# Patient Record
Sex: FEMALE
[De-identification: names, ages, dates, MRNs, and addresses within clinical notes are randomized per-mention and may not be internally consistent; named-entity substitution may affect disease eponyms.]

---

## 2020-09-22 DIAGNOSIS — E03.9 HYPOTHYROIDISM, UNSPECIFIED: ICD-10-CM

## 2020-09-22 DIAGNOSIS — Z72.3 LACK OF PHYSICAL EXERCISE: ICD-10-CM

## 2020-09-22 DIAGNOSIS — R43.2 PARAGEUSIA: ICD-10-CM

## 2020-09-22 DIAGNOSIS — C44.90 UNSPECIFIED MALIGNANT NEOPLASM OF SKIN, UNSPECIFIED: ICD-10-CM

## 2020-09-22 DIAGNOSIS — Z82.49 FAMILY HISTORY OF ISCHEMIC HEART DISEASE AND OTHER DISEASES OF THE CIRCULATORY SYSTEM: ICD-10-CM

## 2020-09-22 RX ORDER — MULTIVIT-MIN/IRON/FOLIC ACID/K 18-600-40
CAPSULE ORAL
Refills: 0 | Status: ACTIVE | COMMUNITY

## 2020-09-22 RX ORDER — COLD-HOT PACK
EACH MISCELLANEOUS
Refills: 0 | Status: ACTIVE | COMMUNITY

## 2020-09-22 RX ORDER — CHROMIUM 200 MCG
TABLET ORAL
Refills: 0 | Status: ACTIVE | COMMUNITY

## 2020-09-22 RX ORDER — VITAMIN B COMPLEX
CAPSULE ORAL
Refills: 0 | Status: ACTIVE | COMMUNITY

## 2020-09-23 ENCOUNTER — NON-APPOINTMENT (OUTPATIENT)
Age: 60
End: 2020-09-23

## 2020-09-23 ENCOUNTER — APPOINTMENT (OUTPATIENT)
Dept: CARDIOLOGY | Facility: CLINIC | Age: 60
End: 2020-09-23
Payer: COMMERCIAL

## 2020-09-23 VITALS
HEART RATE: 70 BPM | TEMPERATURE: 97.9 F | DIASTOLIC BLOOD PRESSURE: 82 MMHG | WEIGHT: 179 LBS | BODY MASS INDEX: 30.56 KG/M2 | HEIGHT: 64 IN | SYSTOLIC BLOOD PRESSURE: 138 MMHG | RESPIRATION RATE: 16 BRPM

## 2020-09-23 DIAGNOSIS — R01.1 CARDIAC MURMUR, UNSPECIFIED: ICD-10-CM

## 2020-09-23 DIAGNOSIS — R09.82 POSTNASAL DRIP: ICD-10-CM

## 2020-09-23 DIAGNOSIS — U07.1 COVID-19: ICD-10-CM

## 2020-09-23 DIAGNOSIS — Z87.891 PERSONAL HISTORY OF NICOTINE DEPENDENCE: ICD-10-CM

## 2020-09-23 DIAGNOSIS — Z82.49 FAMILY HISTORY OF ISCHEMIC HEART DISEASE AND OTHER DISEASES OF THE CIRCULATORY SYSTEM: ICD-10-CM

## 2020-09-23 DIAGNOSIS — Z78.9 OTHER SPECIFIED HEALTH STATUS: ICD-10-CM

## 2020-09-23 PROCEDURE — 99204 OFFICE O/P NEW MOD 45 MIN: CPT

## 2020-09-23 PROCEDURE — 93000 ELECTROCARDIOGRAM COMPLETE: CPT

## 2020-09-23 RX ORDER — FLUTICASONE FUROATE 27.5 UG/1
27.5 SPRAY, METERED NASAL DAILY
Refills: 0 | Status: ACTIVE | COMMUNITY
Start: 2020-09-23

## 2020-09-23 NOTE — DISCUSSION/SUMMARY
[FreeTextEntry1] : 1. Check event monitor to further evaluate palpitations.\par 2. Check echocardiogram given palpitations and her history of COVID.\par 3. Check exercise stress test given her palpitations and family history.\par 4. Check carotid Doppler to further risk stratify and determine the importance of intensification of statin therapy.\par 5. Continue atorvastatin for dyslipidemia.\par 6. Patient encouraged to work on diet to lose weight.\par 7. Follow up here after testing, and will make further recommendations at that time.

## 2020-09-23 NOTE — PHYSICAL EXAM
[General Appearance - In No Acute Distress] : no acute distress [Normal Conjunctiva] : the conjunctiva exhibited no abnormalities [Normal Oral Mucosa] : normal oral mucosa [Auscultation Breath Sounds / Voice Sounds] : lungs were clear to auscultation bilaterally [Abdomen Soft] : soft [Abdomen Tenderness] : non-tender [Abnormal Walk] : normal gait [Nail Clubbing] : no clubbing of the fingernails [Cyanosis, Localized] : no localized cyanosis [Skin Color & Pigmentation] : normal skin color and pigmentation [Oriented To Time, Place, And Person] : oriented to person, place, and time [Affect] : the affect was normal [FreeTextEntry1] : No JVD, no carotid bruits. [Normal Rate] : normal [Rhythm Regular] : regular [Normal S1] : normal S1 [Normal S2] : normal S2 [S3] : no S3 [S4] : no S4 [II] : a grade 2

## 2020-09-23 NOTE — HISTORY OF PRESENT ILLNESS
[FreeTextEntry1] : Patient presents today for evaluation given recent palpitations. She had COVID back at the end of March and beginning of April. She recovered well from that but still has some issues with fatigue. She also still has some issues with loss of smell. More recently she has noticed palpitations. They had been off and on for the past couple of months, but have been less severe over the last few weeks. She had 2 particularly severe episodes of couple of weeks ago which were associated with lightheadedness but she did not syncopize.  She saw her PCP who did an EKG and advised to follow up with me. She does note some association with caffeine and also when she was taking Allegra. They seem to occur primarily at rest and at random. She does not have any real exertional symptoms at all. Patient denies chest pain, shortness of breath, orthopnea.

## 2020-09-23 NOTE — ASSESSMENT
[FreeTextEntry1] : EKG: Sinus rhythm with no significant ST or T wave changes.\par \par 60-year-old female with a past medical history of dyslipidemia and family history of CAD who presents to me for evaluation of palpitations. Patient's palpitations may in fact represent an arrhythmia but they do seem to also be improving. I recommend ambulatory monitoring to further evaluate for arrhythmia. Additionally given her history of COVID she should have an echocardiogram to look for any long-term effects and given her palpitations. Her lipids were apparently very elevated despite taking atorvastatin back in May but have improved now again although still mildly elevated.

## 2020-10-15 ENCOUNTER — APPOINTMENT (OUTPATIENT)
Dept: CARDIOLOGY | Facility: CLINIC | Age: 60
End: 2020-10-15
Payer: COMMERCIAL

## 2020-10-15 PROCEDURE — 93306 TTE W/DOPPLER COMPLETE: CPT

## 2020-10-15 PROCEDURE — 93015 CV STRESS TEST SUPVJ I&R: CPT

## 2020-10-20 ENCOUNTER — APPOINTMENT (OUTPATIENT)
Dept: CARDIOLOGY | Facility: CLINIC | Age: 60
End: 2020-10-20
Payer: COMMERCIAL

## 2020-10-20 VITALS
TEMPERATURE: 98 F | DIASTOLIC BLOOD PRESSURE: 82 MMHG | HEIGHT: 64 IN | RESPIRATION RATE: 16 BRPM | WEIGHT: 183 LBS | BODY MASS INDEX: 31.24 KG/M2 | SYSTOLIC BLOOD PRESSURE: 137 MMHG

## 2020-10-20 DIAGNOSIS — R42 DIZZINESS AND GIDDINESS: ICD-10-CM

## 2020-10-20 PROCEDURE — 99214 OFFICE O/P EST MOD 30 MIN: CPT

## 2020-10-20 NOTE — PHYSICAL EXAM
[General Appearance - In No Acute Distress] : no acute distress [Normal Conjunctiva] : the conjunctiva exhibited no abnormalities [Normal Oral Mucosa] : normal oral mucosa [Auscultation Breath Sounds / Voice Sounds] : lungs were clear to auscultation bilaterally [Normal Rate] : normal [Normal S2] : normal S2 [Normal S1] : normal S1 [Rhythm Regular] : regular [II] : a grade 2 [Abdomen Tenderness] : non-tender [Abnormal Walk] : normal gait [Abdomen Soft] : soft [Skin Color & Pigmentation] : normal skin color and pigmentation [Cyanosis, Localized] : no localized cyanosis [Nail Clubbing] : no clubbing of the fingernails [Affect] : the affect was normal [Oriented To Time, Place, And Person] : oriented to person, place, and time [FreeTextEntry1] : No JVD, no carotid bruits. [S3] : no S3 [S4] : no S4

## 2020-10-20 NOTE — HISTORY OF PRESENT ILLNESS
[FreeTextEntry1] : Patient has been feeling generally well. She is still having some very minor palpitations but no major episodes. No dizziness or lightheadedness. She is having some problems with her feet which have continued to limit her ability to exercise. She reports no other symptoms with exertion. She started wearing her monitor and had one slight episode of palpitations while wearing it so far. Patient denies chest pain, shortness of breath, palpitations, orthopnea, presyncope, syncope.

## 2020-10-20 NOTE — DISCUSSION/SUMMARY
[FreeTextEntry1] : 1. F/u event monitor to further evaluate palpitations.\par 2. Check carotid Doppler to further risk stratify and determine the importance of intensification of statin therapy.\par 3. Continue atorvastatin for dyslipidemia.\par 4. Patient encouraged to work on diet to lose weight.\par 5. Patient is encouraged to exercise at least 30 minutes a day everyday of the week.\par 6. Follow up here in one month.

## 2020-10-20 NOTE — ASSESSMENT
[FreeTextEntry1] : Echocardiogram October 15, 2020 demonstrated left ventricle normal size and function with ejection fraction of 60-65%. No significant valvular or structural abnormality noted.\par \par Exercise stress test October 16, 2020 during which the patient exercised via Rick protocol for 5 minutes and 10 seconds, totaling 6 METs. Exercise was limited by foot pain. Peak heart rate achieved was 146 beats per minute, representing 91% of age-predicted maximum. Blood pressure response was normal. EKG showed no evidence of ischemia.\par \par 60-year-old female with a past medical history of dyslipidemia and family history of CAD who presents to me for f/u of palpitations. Palpitations have improved and are minimal at this time. An echocardiogram and stress testing unremarkable. Her workload was low on the stress test but this was more limited by foot pain. EKG was unremarkable. Review of her monitor for the last few days to show one episode of  beats but this may have been asymptomatic. She is not entirely sure. For now we will continue on the monitor and I will followup when it is completed.

## 2020-11-24 ENCOUNTER — APPOINTMENT (OUTPATIENT)
Dept: CARDIOLOGY | Facility: CLINIC | Age: 60
End: 2020-11-24
Payer: COMMERCIAL

## 2020-11-24 VITALS
WEIGHT: 179 LBS | RESPIRATION RATE: 16 BRPM | DIASTOLIC BLOOD PRESSURE: 80 MMHG | OXYGEN SATURATION: 99 % | BODY MASS INDEX: 30.56 KG/M2 | TEMPERATURE: 97.2 F | HEART RATE: 71 BPM | SYSTOLIC BLOOD PRESSURE: 131 MMHG | HEIGHT: 64 IN

## 2020-11-24 PROCEDURE — 99214 OFFICE O/P EST MOD 30 MIN: CPT

## 2020-11-24 PROCEDURE — 93000 ELECTROCARDIOGRAM COMPLETE: CPT

## 2020-11-24 NOTE — DISCUSSION/SUMMARY
[FreeTextEntry1] : 1. No additional cardiac testing at this time.\par 2. Continue atorvastatin for dyslipidemia.\par 3. Monitor BP at home, keep a log and bring to f/u.\par 4. Patient encouraged to work on diet to lose weight.\par 5. Patient is encouraged to exercise at least 30 minutes a day everyday of the week.\par 6. Patient encouraged  to find ways to reduce stress, such as meditation and/or yoga.\par 7. Follow up here in 4 months.

## 2020-11-24 NOTE — REASON FOR VISIT
[FreeTextEntry1] : Patient comes for f/u of palpitations and SVT
Breath sounds clear and equal bilaterally.

## 2020-11-24 NOTE — ASSESSMENT
[FreeTextEntry1] : Echocardiogram October 15, 2020 demonstrated left ventricle normal size and function with ejection fraction of 60-65%. No significant valvular or structural abnormality noted.\par \par Exercise stress test October 16, 2020 during which the patient exercised via Rick protocol for 5 minutes and 10 seconds, totaling 6 METs. Exercise was limited by foot pain. Peak heart rate achieved was 146 beats per minute, representing 91% of age-predicted maximum. Blood pressure response was normal. EKG showed no evidence of ischemia.\par \par Carotid Doppler October 21, 2020 demonstrated no plaque and was normal bilaterally.\par \par 60-year-old female with a past medical history of dyslipidemia, SVT and family history of CAD who presents to me for f/u of palpitations. Palpitations have improved.  Review of the rest of her event monitor shows no further SVT or any other events.  Her palpitations have improved, even more so since she discontinued the use of caffeine.  Certainly continued efforts at stress relief and trying to start exercising more as her heel spur may be improving will also help.  Blood pressure appears to be reasonably controlled for now and I do not see need for treatment.

## 2020-11-24 NOTE — HISTORY OF PRESENT ILLNESS
[FreeTextEntry1] : Patient presents back today having really no further issues with palpitations.  She has cut out caffeine entirely because she thought it may be responsible for her palpitations and also some occasional elevated blood pressures.  Blood pressures have mostly been in the 120s over 70s to 80s, a bit higher on her left arm than her right.  She notes that when she drinks caffeine it is higher into the 130s or even 140s.  She reports no other new symptoms at this time.  Patient denies chest pain, shortness of breath, orthopnea, presyncope, syncope.

## 2021-03-16 ENCOUNTER — APPOINTMENT (OUTPATIENT)
Dept: CARDIOLOGY | Facility: CLINIC | Age: 61
End: 2021-03-16

## 2021-05-11 ENCOUNTER — APPOINTMENT (OUTPATIENT)
Dept: PHYSICAL MEDICINE AND REHAB | Facility: CLINIC | Age: 61
End: 2021-05-11
Payer: COMMERCIAL

## 2021-05-11 VITALS
OXYGEN SATURATION: 99 % | TEMPERATURE: 97 F | HEIGHT: 64 IN | BODY MASS INDEX: 30.73 KG/M2 | DIASTOLIC BLOOD PRESSURE: 89 MMHG | WEIGHT: 180 LBS | HEART RATE: 62 BPM | RESPIRATION RATE: 14 BRPM | SYSTOLIC BLOOD PRESSURE: 143 MMHG

## 2021-05-11 DIAGNOSIS — M17.0 BILATERAL PRIMARY OSTEOARTHRITIS OF KNEE: ICD-10-CM

## 2021-05-11 DIAGNOSIS — Z86.39 PERSONAL HISTORY OF OTHER ENDOCRINE, NUTRITIONAL AND METABOLIC DISEASE: ICD-10-CM

## 2021-05-11 DIAGNOSIS — Z78.9 OTHER SPECIFIED HEALTH STATUS: ICD-10-CM

## 2021-05-11 PROCEDURE — 99204 OFFICE O/P NEW MOD 45 MIN: CPT

## 2021-05-11 PROCEDURE — 99072 ADDL SUPL MATRL&STAF TM PHE: CPT

## 2021-05-11 RX ORDER — LEVOTHYROXINE SODIUM 88 UG/1
88 TABLET ORAL DAILY
Refills: 0 | Status: DISCONTINUED | COMMUNITY
End: 2021-05-11

## 2021-05-11 NOTE — PHYSICAL EXAM
[FreeTextEntry1] : NAD\par A&Ox3\par Non-obese\par Inspection knees:\par Effusion: none\par ROM: full\par JLT: LEFT > RIGHT MJLT\par Patella: LOCATED\par Stability: STABLE V/V stresses\par Armando's: deferred\par MMT: 5/5 KE\par Sensation: SILT\par LEFT ANKLE/HEEL\par ROM 10-15' DF\par DISTAL 1/3 ACHILLES TTP W/ PALPABLE ENTHESOPHYTES\par MEDIAL CALCANEAL TUBERCLE TTP\par MEDIAL CORD PLANTAR FASCIA NTTP\par NEGATIVE WINDLASS TEST\par RIGHT ANKLE/HEEL\par DISTAL 1/3 ACHILLES MILDLY TTP\par

## 2021-05-11 NOTE — ASSESSMENT
[FreeTextEntry1] : 61 y.o. F w/ h/o b/l knee OA, heel spurs and partial left AT tear/tendinopathy.  I spent most of today's visit (30 min) discussing the pathogenesis of the patient's painful MSK conditions.  I have asked her to obtain her most recent xrays for my review.  We will order new xrays b/l knees.  Will perform diagnostic MSK US examinations b/l Achilles and posterior ankle to evaluate extent of tear and tendinopathy.   Advised against repeat CSI to heel to avoid propagation of tendon/fascia tears.  May consider regen medicine procedures (ie, PRP) after US examination.  Upon review of imaging, will start P.T. for modalities, gentle ROM and strengthening exercises.

## 2021-05-11 NOTE — HISTORY OF PRESENT ILLNESS
[FreeTextEntry1] : 61 y.o. F w/ h/o PSVT and COVID infection (April 2020) presents to office w/ c/o b/l knee and heel pain.  Pt. states that she suffered partially torn Achilles tendon left ankle (Jan 2020) and wore walking boot (June 2020).  Pt. has been under care of podiatrist and outside ortho.  Pt. has had P.T. for her ankle and heel pain.  No recent knee x-rays but had xrays of heels.  Pt. states that she had CSI x 2 left heel which may have causes Achilles tendon tear.

## 2021-06-07 ENCOUNTER — APPOINTMENT (OUTPATIENT)
Dept: PHYSICAL MEDICINE AND REHAB | Facility: CLINIC | Age: 61
End: 2021-06-07

## 2021-06-14 ENCOUNTER — APPOINTMENT (OUTPATIENT)
Dept: OTOLARYNGOLOGY | Facility: CLINIC | Age: 61
End: 2021-06-14
Payer: COMMERCIAL

## 2021-06-14 VITALS
BODY MASS INDEX: 31.24 KG/M2 | TEMPERATURE: 97.3 F | HEIGHT: 64 IN | HEART RATE: 66 BPM | SYSTOLIC BLOOD PRESSURE: 134 MMHG | DIASTOLIC BLOOD PRESSURE: 78 MMHG | WEIGHT: 183 LBS

## 2021-06-14 DIAGNOSIS — H61.23 IMPACTED CERUMEN, BILATERAL: ICD-10-CM

## 2021-06-14 DIAGNOSIS — J30.9 ALLERGIC RHINITIS, UNSPECIFIED: ICD-10-CM

## 2021-06-14 DIAGNOSIS — H90.3 SENSORINEURAL HEARING LOSS, BILATERAL: ICD-10-CM

## 2021-06-14 PROCEDURE — G0268 REMOVAL OF IMPACTED WAX MD: CPT

## 2021-06-14 PROCEDURE — 99213 OFFICE O/P EST LOW 20 MIN: CPT | Mod: 25

## 2021-06-14 PROCEDURE — 99072 ADDL SUPL MATRL&STAF TM PHE: CPT

## 2021-06-14 PROCEDURE — 92567 TYMPANOMETRY: CPT

## 2021-06-14 PROCEDURE — 92557 COMPREHENSIVE HEARING TEST: CPT

## 2021-06-14 NOTE — REVIEW OF SYSTEMS
[Sneezing] : sneezing [Seasonal Allergies] : seasonal allergies [Post Nasal Drip] : post nasal drip [Ear Pain] : ear pain [Ear Itch] : ear itch [Hearing Loss] : hearing loss [Ear Noises] : ear noises [Sinus Pressure] : sinus pressure [Hoarseness] : hoarseness [Heartburn] : heartburn [As Noted in HPI] : as noted in HPI [FreeTextEntry1] : headache,watery itchy eyes

## 2021-06-14 NOTE — HISTORY OF PRESENT ILLNESS
[de-identified] : 61 yr old female c/o itch AU and otalgia AS\par +allergy acting up, doing well on Flonase Sensimyst\par +known SNHL\par +tinnitus\par +vertigo 10 yrs ago, not since then, tx w Epley\par +hx CI\par +hx otitis\par -noise exp, head trauma\par +FH father, pat aunt cochlear implant

## 2021-06-14 NOTE — PHYSICAL EXAM
[Normal] : mucosa is normal [Midline] : trachea located in midline position [de-identified] : CI AU

## 2021-06-14 NOTE — DATA REVIEWED
[de-identified] : Hx of Bilateral SNHL, c/o left otalgia\par - Type A tymps AU\par - Results obtained via insert earphones revealed Hearing WNL sloping to a moderate to moderately-severe SNHL AU (Slight Right Asymm noted at 3000hz)\par Rec: 1) ENT F/u 2) Re-eval as per md 3) Further testing as per MD 4) Binaural amplification pending MD clearance

## 2021-07-12 ENCOUNTER — APPOINTMENT (OUTPATIENT)
Dept: PHARMACY | Facility: CLINIC | Age: 61
End: 2021-07-12

## 2022-01-05 ENCOUNTER — NON-APPOINTMENT (OUTPATIENT)
Age: 62
End: 2022-01-05

## 2022-01-05 ENCOUNTER — APPOINTMENT (OUTPATIENT)
Dept: CARDIOLOGY | Facility: CLINIC | Age: 62
End: 2022-01-05
Payer: COMMERCIAL

## 2022-01-05 VITALS
HEIGHT: 64 IN | WEIGHT: 185 LBS | SYSTOLIC BLOOD PRESSURE: 147 MMHG | HEART RATE: 66 BPM | OXYGEN SATURATION: 99 % | BODY MASS INDEX: 31.58 KG/M2 | DIASTOLIC BLOOD PRESSURE: 87 MMHG | RESPIRATION RATE: 16 BRPM

## 2022-01-05 PROCEDURE — 99214 OFFICE O/P EST MOD 30 MIN: CPT

## 2022-01-05 PROCEDURE — 93000 ELECTROCARDIOGRAM COMPLETE: CPT

## 2022-01-05 RX ORDER — LEVOTHYROXINE SODIUM 0.09 MG/1
88 TABLET ORAL
Refills: 0 | Status: DISCONTINUED | COMMUNITY
End: 2022-01-05

## 2022-01-05 NOTE — ASSESSMENT
[FreeTextEntry1] : Echocardiogram October 15, 2020 demonstrated left ventricle normal size and function with ejection fraction of 60-65%. No significant valvular or structural abnormality noted.\par \par Exercise stress test October 16, 2020 during which the patient exercised via Rick protocol for 5 minutes and 10 seconds, totaling 6 METs. Exercise was limited by foot pain. Peak heart rate achieved was 146 beats per minute, representing 91% of age-predicted maximum. Blood pressure response was normal. EKG showed no evidence of ischemia.\par \par Carotid Doppler October 21, 2020 demonstrated no plaque and was normal bilaterally.\par \par EKG: Sinus rhythm with nonspecific T wave changes, not significantly changed from prior.\par \par 60-year-old female with a past medical history of dyslipidemia, SVT and family history of CAD who presents to me for f/u.  Patient appears reasonably stable from a cardiac standpoint.  Her palpitations have remained controlled since she has been off the Synthroid.  Blood pressure appears to be reasonably controlled as well and there is no evidence of significant hypertension.  Her lipids are well controlled with atorvastatin.  Certainly she needs to work more on diet, exercise and weight loss.  She will follow-up as to whether or not she needs to be back on Synthroid as well which may also help with her weight loss.  There is no evidence of recurrent arrhythmia at this time.

## 2022-01-05 NOTE — DISCUSSION/SUMMARY
[FreeTextEntry1] : 1. No additional cardiac testing at this time.\par 2. Continue atorvastatin for dyslipidemia.\par 3. Monitor BP at home, keep a log and bring to f/u.\par 4. Patient encouraged to work on diet to lose weight.\par 5. Patient is encouraged to exercise at least 30 minutes a day everyday of the week.\par 6. Patient encouraged  to find ways to reduce stress, such as meditation and/or yoga.\par 7. Follow up here in 6 months.

## 2022-01-05 NOTE — HISTORY OF PRESENT ILLNESS
[FreeTextEntry1] : Patient presents back to the office today having not been seen in over a year.  Her initial appointment had been canceled and she says "life got in the way".  She had been having some issues with palpitations again and she thought it was related to her Synthroid so she stopped it.  After speaking to her doctor she went back on an every other day but ultimately stopped it completely again.  In October her thyroid functions off the Synthroid were actually normal.  More recently she has been noting some hair loss and issues with gaining weight and is wondering if she needs to be on the Synthroid again.  She believes that she has side effects from the generic and needs to be on the trade Synthroid.  Her palpitations have not really returned at all and she is otherwise feeling physically well.  She is taking atorvastatin faithfully.  She reports blood pressures at home in the 120s over 70s.  Patient denies chest pain, shortness of breath, orthopnea, presyncope, syncope.

## 2022-09-07 ENCOUNTER — NON-APPOINTMENT (OUTPATIENT)
Age: 62
End: 2022-09-07

## 2022-09-07 ENCOUNTER — APPOINTMENT (OUTPATIENT)
Dept: CARDIOLOGY | Facility: CLINIC | Age: 62
End: 2022-09-07

## 2022-09-07 VITALS
OXYGEN SATURATION: 97 % | HEART RATE: 99 BPM | WEIGHT: 186 LBS | DIASTOLIC BLOOD PRESSURE: 83 MMHG | RESPIRATION RATE: 16 BRPM | HEIGHT: 64 IN | BODY MASS INDEX: 31.76 KG/M2 | SYSTOLIC BLOOD PRESSURE: 121 MMHG

## 2022-09-07 DIAGNOSIS — R03.0 ELEVATED BLOOD-PRESSURE READING, W/OUT DIAGNOSIS OF HYPERTENSION: ICD-10-CM

## 2022-09-07 PROCEDURE — 93000 ELECTROCARDIOGRAM COMPLETE: CPT

## 2022-09-07 PROCEDURE — 99214 OFFICE O/P EST MOD 30 MIN: CPT | Mod: 25

## 2022-09-07 RX ORDER — ATORVASTATIN CALCIUM 20 MG/1
20 TABLET, FILM COATED ORAL
Qty: 30 | Refills: 3 | Status: DISCONTINUED | COMMUNITY
End: 2022-09-07

## 2022-09-07 NOTE — HISTORY OF PRESENT ILLNESS
[FreeTextEntry1] : Patient Akitz back today noting that she has almost no palpitations at this point.  She has been very cautiously avoiding caffeine and using only decaf and trying to avoid other sources of caffeine.  With doing that her palpitations have almost completely resolved.  She has not been very consistent with taking her atorvastatin.  She is concerned that it is causing some pains that she has been having in her legs.  She has a lot of pain in her legs especially when she lays down and gets into certain positions.  She tries using heat and cold and this seems to help to some degree.  She has ultimately been taking the atorvastatin 3 to 4 days a week because of this.  The last week has been particularly more severe for her legs and so she has not been taking it at all.  Besides her legs she reports no other physical symptoms.  Patient denies chest pain, shortness of breath, palpitations, orthopnea, presyncope, syncope.

## 2022-09-07 NOTE — ASSESSMENT
[FreeTextEntry1] : Echocardiogram October 15, 2020 demonstrated left ventricle normal size and function with ejection fraction of 60-65%. No significant valvular or structural abnormality noted.\par \par Exercise stress test October 16, 2020 during which the patient exercised via Rick protocol for 5 minutes and 10 seconds, totaling 6 METs. Exercise was limited by foot pain. Peak heart rate achieved was 146 beats per minute, representing 91% of age-predicted maximum. Blood pressure response was normal. EKG showed no evidence of ischemia.\par \par Carotid Doppler October 21, 2020 demonstrated no plaque and was normal bilaterally.\par \par EKG: Sinus rhythm with no significant ST or T wave changes.\par \par 62-year-old female with a past medical history of dyslipidemia, SVT and family history of CAD who presents to me for f/u.  Patient appears stable from a cardiac standpoint.  No evidence of significant recurrent SVT and her palpitations are well controlled.  EKG today is unremarkable.  She has been taking atorvastatin only intermittently because of issues with pains in her legs.  The description of the pains in her legs seem to be unlikely related to her statin.  I will change it to rosuvastatin given her concerns however and also encouraged her to take coenzyme every 10.  She should follow-up with her primary for further evaluation of the issues with the legs which could be related to her back or possibly her hips.  Blood pressure is adequately controlled at this time.

## 2022-09-07 NOTE — DISCUSSION/SUMMARY
[FreeTextEntry1] : 1. No additional cardiac testing at this time.\par 2. Change atorvastatin to rosuvastatin 10 mg daily for her dyslipidemia.  She will take coenzyme every 10 200 mg daily as well to try and help with the leg pains.  Blood work prior to follow-up.\par 3. Monitor BP at home, keep a log and bring to f/u.\par 4. Patient encouraged to work on diet to lose weight.\par 5. Patient is encouraged to exercise at least 30 minutes a day everyday of the week.\par 6. Patient encouraged  to find ways to reduce stress, such as meditation and/or yoga.\par 7. Follow up here in 6 months. [EKG obtained to assist in diagnosis and management of assessed problem(s)] : EKG obtained to assist in diagnosis and management of assessed problem(s)

## 2022-12-06 ENCOUNTER — APPOINTMENT (OUTPATIENT)
Dept: ORTHOPEDIC SURGERY | Facility: CLINIC | Age: 62
End: 2022-12-06

## 2022-12-06 VITALS — WEIGHT: 161 LBS | BODY MASS INDEX: 27.49 KG/M2 | HEIGHT: 64 IN

## 2022-12-06 DIAGNOSIS — S61.431S: ICD-10-CM

## 2022-12-06 DIAGNOSIS — L03.011 CELLULITIS OF RIGHT FINGER: ICD-10-CM

## 2022-12-06 DIAGNOSIS — Z78.9 OTHER SPECIFIED HEALTH STATUS: ICD-10-CM

## 2022-12-06 PROCEDURE — 99203 OFFICE O/P NEW LOW 30 MIN: CPT

## 2022-12-06 NOTE — DISCUSSION/SUMMARY
[de-identified] : Although she describes a thorn puncture injury, there are only local sings of cellulitis, improving , no signs of progressive infection, ie lymphadenopathy or cutaneous signs up the forearm.  Will monitor.  Early return criteria discussed

## 2022-12-06 NOTE — PHYSICAL EXAM
[Right] : right hand [FreeTextEntry3] : Induration over radial volar middle phalanx \par Flexor sheath non tender \par Hyperemia over middle phalanx  [de-identified] : rt index finger swelling

## 2022-12-06 NOTE — HISTORY OF PRESENT ILLNESS
[Sudden] : sudden [4] : 4 [Dull/Aching] : dull/aching [Constant] : constant [Nothing helps with pain getting better] : Nothing helps with pain getting better [de-identified] : 62 year old female presenting with RT index finger puncture wound with a flower thorn. Swelling for about 3 weeks. She was given a 5 day course of abx (z-pack). She is still with swelling in the finger.  [] : no [FreeTextEntry1] : rt index finger [FreeTextEntry3] : 1 week  [FreeTextEntry5] : pt stated she was working when she poked herself with a thorn causing swelling and pain  [de-identified] : use

## 2022-12-23 ENCOUNTER — APPOINTMENT (OUTPATIENT)
Dept: ORTHOPEDIC SURGERY | Facility: CLINIC | Age: 62
End: 2022-12-23

## 2023-01-05 ENCOUNTER — APPOINTMENT (OUTPATIENT)
Dept: CARDIOLOGY | Facility: CLINIC | Age: 63
End: 2023-01-05
Payer: COMMERCIAL

## 2023-01-05 VITALS
DIASTOLIC BLOOD PRESSURE: 81 MMHG | BODY MASS INDEX: 27.14 KG/M2 | OXYGEN SATURATION: 100 % | RESPIRATION RATE: 16 BRPM | WEIGHT: 159 LBS | HEIGHT: 64 IN | HEART RATE: 75 BPM | SYSTOLIC BLOOD PRESSURE: 118 MMHG

## 2023-01-05 PROCEDURE — 99214 OFFICE O/P EST MOD 30 MIN: CPT | Mod: 25

## 2023-01-05 PROCEDURE — 93000 ELECTROCARDIOGRAM COMPLETE: CPT

## 2023-01-05 RX ORDER — ROSUVASTATIN CALCIUM 10 MG/1
10 TABLET, FILM COATED ORAL
Qty: 90 | Refills: 1 | Status: DISCONTINUED | COMMUNITY
Start: 2022-09-07 | End: 2023-01-05

## 2023-01-05 RX ORDER — ATORVASTATIN CALCIUM 20 MG/1
20 TABLET, FILM COATED ORAL DAILY
Refills: 0 | Status: ACTIVE | COMMUNITY

## 2023-01-05 NOTE — DISCUSSION/SUMMARY
[FreeTextEntry1] : 1. No additional cardiac testing at this time.\par 2.  Continue atorvastatin for her dyslipidemia.  She will also take coenzyme every 10 200 mg daily as well to try and help with the leg pains.  Blood work with her primary at the end of the month.\par 3. Monitor BP at home, keep a log and bring to f/u.\par 4. Patient encouraged to work on diet to lose weight.\par 5. Patient is encouraged to exercise at least 30 minutes a day everyday of the week.\par 6. Follow up here in 6 months. [EKG obtained to assist in diagnosis and management of assessed problem(s)] : EKG obtained to assist in diagnosis and management of assessed problem(s)

## 2023-01-05 NOTE — ASSESSMENT
[FreeTextEntry1] : Echocardiogram October 15, 2020 demonstrated left ventricle normal size and function with ejection fraction of 60-65%. No significant valvular or structural abnormality noted.\par \par Exercise stress test October 16, 2020 during which the patient exercised via Rick protocol for 5 minutes and 10 seconds, totaling 6 METs. Exercise was limited by foot pain. Peak heart rate achieved was 146 beats per minute, representing 91% of age-predicted maximum. Blood pressure response was normal. EKG showed no evidence of ischemia.\par \par Carotid Doppler October 21, 2020 demonstrated no plaque and was normal bilaterally.\par \par 1 month event monitor October 12 to November 10, 2020 demonstrated presenting rhythm was sinus with an average heart rate of 73 bpm, maximum 172, minimum 50 bpm.  A single episode of SVT was noted totaling 11 beats.  Rare PACs and PVCs.\par \par EKG: Sinus rhythm with borderline T wave changes.\par \par 62-year-old female with a past medical history of dyslipidemia, SVT and family history of CAD who presents to me for f/u.  Patient appears stable from a cardiac standpoint.  No evidence of significant recurrent SVT and her palpitations are well controlled.  EKG today is unremarkable.  She never got rosuvastatin but has been taking atorvastatin every day and seems to be generally tolerating it.  The pains in her legs and feet still appear to be unlikely secondary to her statin.  I have encouraged her to take coenzyme Q10 which may help with the leg pains but also to follow-up with her primary.  She is going to have blood work at the end of the month.  If her lipids are controlled she will just continue with atorvastatin.  Blood pressures appear to be reasonably controlled for now although she needs to continue to watch them closely.

## 2023-01-05 NOTE — HISTORY OF PRESENT ILLNESS
[FreeTextEntry1] : Patient presents back to the office today still taking atorvastatin but now taking it every day.  There was some confusion about the potential change to rosuvastatin and she never got the prescription.  She says she is still having pains in her legs but not every day and they are not very severe.  She is willing to continue with atorvastatin every day.  Blood pressure she reports in 110s to the 130s over 70s to 80s.  She reports no other physical symptoms at this time.  No dizziness or lightheadedness.  Patient denies chest pain, shortness of breath, palpitations, orthopnea, presyncope, syncope.

## 2023-10-06 ENCOUNTER — APPOINTMENT (OUTPATIENT)
Dept: CARDIOLOGY | Facility: CLINIC | Age: 63
End: 2023-10-06
Payer: COMMERCIAL

## 2023-10-06 ENCOUNTER — NON-APPOINTMENT (OUTPATIENT)
Age: 63
End: 2023-10-06

## 2023-10-06 VITALS
SYSTOLIC BLOOD PRESSURE: 122 MMHG | DIASTOLIC BLOOD PRESSURE: 85 MMHG | HEIGHT: 64 IN | RESPIRATION RATE: 16 BRPM | WEIGHT: 146 LBS | BODY MASS INDEX: 24.92 KG/M2 | HEART RATE: 67 BPM

## 2023-10-06 DIAGNOSIS — R07.89 OTHER CHEST PAIN: ICD-10-CM

## 2023-10-06 DIAGNOSIS — I47.10 SUPRAVENTRICULAR TACHYCARDIA, UNSPECIFIED: ICD-10-CM

## 2023-10-06 DIAGNOSIS — R00.2 PALPITATIONS: ICD-10-CM

## 2023-10-06 DIAGNOSIS — E78.5 HYPERLIPIDEMIA, UNSPECIFIED: ICD-10-CM

## 2023-10-06 PROCEDURE — 93000 ELECTROCARDIOGRAM COMPLETE: CPT

## 2023-10-06 PROCEDURE — 99214 OFFICE O/P EST MOD 30 MIN: CPT | Mod: 25

## 2023-10-06 RX ORDER — LEVOTHYROXINE SODIUM 137 UG/1
TABLET ORAL
Refills: 0 | Status: ACTIVE | COMMUNITY

## 2023-11-27 ENCOUNTER — APPOINTMENT (OUTPATIENT)
Dept: CARDIOLOGY | Facility: CLINIC | Age: 63
End: 2023-11-27
Payer: COMMERCIAL

## 2023-11-27 PROCEDURE — 93015 CV STRESS TEST SUPVJ I&R: CPT

## 2023-11-27 PROCEDURE — 93306 TTE W/DOPPLER COMPLETE: CPT

## 2024-08-14 ENCOUNTER — APPOINTMENT (OUTPATIENT)
Dept: VASCULAR SURGERY | Facility: CLINIC | Age: 64
End: 2024-08-14
Payer: COMMERCIAL

## 2024-08-14 VITALS
SYSTOLIC BLOOD PRESSURE: 142 MMHG | BODY MASS INDEX: 23.73 KG/M2 | HEIGHT: 64 IN | HEART RATE: 65 BPM | WEIGHT: 139 LBS | DIASTOLIC BLOOD PRESSURE: 84 MMHG | OXYGEN SATURATION: 98 %

## 2024-08-14 DIAGNOSIS — I83.93 ASYMPTOMATIC VARICOSE VEINS OF BILATERAL LOWER EXTREMITIES: ICD-10-CM

## 2024-08-14 PROCEDURE — 99204 OFFICE O/P NEW MOD 45 MIN: CPT

## 2024-08-14 PROCEDURE — 93970 EXTREMITY STUDY: CPT

## 2024-08-14 RX ORDER — LOVASTATIN 40 MG/1
TABLET ORAL
Refills: 0 | Status: ACTIVE | COMMUNITY

## 2024-08-14 NOTE — ASSESSMENT
[FreeTextEntry1] : 64 year old female with h/o longstanding superficial venous insufficiency, symptomatic varicosities, C2 CVI -encouraged use of compression hose -would benefit from  1. Right SAP UGS  2. Left SAP UGS Cosmetic injection sclerotherapy, 3-6 sessions to follow if desired

## 2024-08-14 NOTE — PHYSICAL EXAM
[2+] : left 2+ [Varicose Veins Of Lower Extremities] : bilaterally [Ankle Swelling On The Left] : moderate [] : present [Ankle Swelling On The Right] : mild [No Rash or Lesion] : No rash or lesion [Alert] : alert [Oriented to Person] : oriented to person [Oriented to Place] : oriented to place [Oriented to Time] : oriented to time [Calm] : calm [Ankle Swelling (On Exam)] : not present [de-identified] : alert/oriented NAD  [FreeTextEntry1] : LE warm, well perfused  brisk cap refill < 3 seconds  right medial calf dilated > 3mm varicosities with associated telangiectasias  left posterior thigh varicosities 2-3mm  no SVT  no edema  [de-identified] : no stu joint deformity

## 2024-08-14 NOTE — HISTORY OF PRESENT ILLNESS
[FreeTextEntry1] : 64 year old female with PMH of HLD, Hypothyroidism presents for vascular consultation for progressively worsening symptomatic varicosities of both lower extremities.  Complains of longstanding leg pain, aching and fatigue with dependency.  Has tried compression hose in the past with minimal relief.  Denies previous venous interventions, h/o DVT or SVT/thrombophilia.   Does report a family history of Factor V Leiden (sister). Denies prior personal thrombophilia workup.  Works as a  and stands on her feet for prolonged periods of time.  She denies associated edema or history of non healing ulcerations.

## 2024-12-02 ENCOUNTER — TRANSCRIPTION ENCOUNTER (OUTPATIENT)
Age: 64
End: 2024-12-02

## 2024-12-02 ENCOUNTER — APPOINTMENT (OUTPATIENT)
Dept: VASCULAR SURGERY | Facility: CLINIC | Age: 64
End: 2024-12-02
Payer: COMMERCIAL

## 2024-12-02 PROCEDURE — 37766 PHLEB VEINS - EXTREM 20+: CPT | Mod: RT

## 2024-12-02 PROCEDURE — 36471 NJX SCLRSNT MLT INCMPTNT VN: CPT | Mod: RT

## 2024-12-02 PROCEDURE — 76942 ECHO GUIDE FOR BIOPSY: CPT

## 2024-12-09 ENCOUNTER — APPOINTMENT (OUTPATIENT)
Dept: VASCULAR SURGERY | Facility: CLINIC | Age: 64
End: 2024-12-09

## 2024-12-09 DIAGNOSIS — I83.93 ASYMPTOMATIC VARICOSE VEINS OF BILATERAL LOWER EXTREMITIES: ICD-10-CM

## 2024-12-09 PROCEDURE — 93971 EXTREMITY STUDY: CPT | Mod: RT

## 2024-12-09 PROCEDURE — 99024 POSTOP FOLLOW-UP VISIT: CPT

## 2025-01-06 ENCOUNTER — APPOINTMENT (OUTPATIENT)
Dept: VASCULAR SURGERY | Facility: CLINIC | Age: 65
End: 2025-01-06
Payer: COMMERCIAL

## 2025-01-06 DIAGNOSIS — I83.93 ASYMPTOMATIC VARICOSE VEINS OF BILATERAL LOWER EXTREMITIES: ICD-10-CM

## 2025-01-06 PROCEDURE — 99212 OFFICE O/P EST SF 10 MIN: CPT

## 2025-02-24 ENCOUNTER — NON-APPOINTMENT (OUTPATIENT)
Age: 65
End: 2025-02-24

## 2025-02-26 ENCOUNTER — APPOINTMENT (OUTPATIENT)
Dept: CARDIOLOGY | Facility: CLINIC | Age: 65
End: 2025-02-26

## 2025-04-03 ENCOUNTER — APPOINTMENT (OUTPATIENT)
Dept: VASCULAR SURGERY | Facility: CLINIC | Age: 65
End: 2025-04-03
Payer: COMMERCIAL

## 2025-04-03 PROCEDURE — 36471 NJX SCLRSNT MLT INCMPTNT VN: CPT | Mod: LT

## 2025-04-03 PROCEDURE — 76942 ECHO GUIDE FOR BIOPSY: CPT

## 2025-04-03 PROCEDURE — 37765 STAB PHLEB VEINS XTR 10-20: CPT | Mod: LT

## 2025-04-10 ENCOUNTER — APPOINTMENT (OUTPATIENT)
Dept: VASCULAR SURGERY | Facility: CLINIC | Age: 65
End: 2025-04-10
Payer: COMMERCIAL

## 2025-04-10 PROCEDURE — 99024 POSTOP FOLLOW-UP VISIT: CPT

## 2025-04-10 PROCEDURE — 93971 EXTREMITY STUDY: CPT | Mod: LT

## 2025-05-15 ENCOUNTER — APPOINTMENT (OUTPATIENT)
Dept: CARDIOLOGY | Facility: CLINIC | Age: 65
End: 2025-05-15
Payer: COMMERCIAL

## 2025-05-15 ENCOUNTER — NON-APPOINTMENT (OUTPATIENT)
Age: 65
End: 2025-05-15

## 2025-05-15 VITALS
RESPIRATION RATE: 16 BRPM | WEIGHT: 138 LBS | SYSTOLIC BLOOD PRESSURE: 119 MMHG | HEIGHT: 64 IN | HEART RATE: 75 BPM | BODY MASS INDEX: 23.56 KG/M2 | DIASTOLIC BLOOD PRESSURE: 81 MMHG

## 2025-05-15 DIAGNOSIS — I47.10 SUPRAVENTRICULAR TACHYCARDIA, UNSPECIFIED: ICD-10-CM

## 2025-05-15 DIAGNOSIS — E78.5 HYPERLIPIDEMIA, UNSPECIFIED: ICD-10-CM

## 2025-05-15 PROCEDURE — 99214 OFFICE O/P EST MOD 30 MIN: CPT

## 2025-05-15 PROCEDURE — 93000 ELECTROCARDIOGRAM COMPLETE: CPT

## 2025-05-15 RX ORDER — ATORVASTATIN CALCIUM 20 MG/1
20 TABLET, FILM COATED ORAL
Qty: 30 | Refills: 3 | Status: ACTIVE | COMMUNITY
Start: 2025-05-15